# Patient Record
Sex: MALE | Race: WHITE | HISPANIC OR LATINO
[De-identification: names, ages, dates, MRNs, and addresses within clinical notes are randomized per-mention and may not be internally consistent; named-entity substitution may affect disease eponyms.]

---
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
## 2022-01-02 NOTE — REASON FOR VISIT
[New Patient Visit] : a new patient visit [Parent] : parent [FreeTextEntry1] : growing back lesion with pain- referred by dermatology

## 2022-01-02 NOTE — CONSULT LETTER
[Dear  ___] : Dear  [unfilled], [Courtesy Letter:] : I had the pleasure of seeing your patient, [unfilled], in my office today. [Sincerely,] : Sincerely, [FreeTextEntry2] : Dr Tate Del Toro\par [address], [address]\[place] [address] [FreeTextEntry1] :  this very pleasant 20-year-old otherwise healthy male is seen for concerns over an expanding lesion in the posterior cervical thoracic region.  The patient is seen along with his parents.  The patient indicates that he has had this mass at the base of his neck for approximately 10 years.  However, it has been expanding rapidly over the last year.  It now causes discomfort especially with direct pressure such as when sleeping or with close over it.  The patient denies any trauma.  There have not been any other lumps noticed on the patient's skin.  He has been seen by dermatology.  The dermatology evaluation however was concerned by the location and the proximity to the posterior elements of the spine that this may represent something affecting the spinous process or deeper structures.  Appropriately a CT image has now been performed.\par \par I have reviewed with the parents and the patient directly the CT images performed at [facility].  These identify a mass lesion which is very discrete within the subcutaneous tissues.  Fortunately there is no evidence of involvement of the spinal column.  The lesion is just adjacent to the midline at the level of the spinous process of T1 and T2.  There is no evidence of infiltration into the other adjacent musculature.  No other mass lesions are seen.  The alignment of the cervical and upper thoracic spine is normal without any signs of neurocompression.\par \par On examination the patient is in no acute distress.  The patient has a palpable mass at the level of T1 and T2 consistent with the imaging studies.  There is no redness or obvious cutaneous signs other than a protruding lump.  The lump is mobile.  There is only mild discomfort with manipulation.  The patient has normal neck range of motion especially with flexion extension and rotation.  The patient has intact movement and mobility of the scapula.\par \par I have reassured the patient and his parents that this is a likely benign sebaceous cyst or dermoid without any connection to the underlying spine.  There is no evidence of infiltration into the musculature.  It is certainly safe for this lesion to be removed by either a general surgeon or dermatologist.  It is my recommendation that now there is reassurance that the spinal structures are intact that a dermatologist would be appropriate for surgical excision even within the office space.  The patient was very relieved.  At this time I have not arranged for any further follow-up but would be more than pleased to see the patient again should any further concerns or questions arise.\par \par Thank you for very kindly including me in the evaluation and support of your patient.  Please do not hesitate to contact me should you have any concerns or questions regarding this evaluation or the patient's ongoing follow-up care. [FreeTextEntry3] : Willian Michelle MD, PhD, FRCPSC\par  of Neurosurgery\par [facility]\par  of Neurosurgery\par [facility]\par [address] [place] [address], [address]\[place] [address]\par Tel: [phone]\par FAX: [phone]\par

## 2022-01-03 ENCOUNTER — APPOINTMENT (OUTPATIENT)
Dept: DERMATOLOGY | Facility: CLINIC | Age: 22
End: 2022-01-03
Payer: MEDICAID

## 2022-01-03 ENCOUNTER — APPOINTMENT (OUTPATIENT)
Dept: DERMATOLOGY | Facility: CLINIC | Age: 22
End: 2022-01-03

## 2022-01-03 DIAGNOSIS — B07.9 VIRAL WART, UNSPECIFIED: ICD-10-CM

## 2022-01-03 DIAGNOSIS — L72.3 SEBACEOUS CYST: ICD-10-CM

## 2022-01-03 LAB — CORE LAB BIOPSY: NORMAL

## 2022-01-03 PROCEDURE — ZZZZZ: CPT

## 2022-01-03 NOTE — HISTORY OF PRESENT ILLNESS
[de-identified] : Patient presents for suture removal- \par wound healed well, no signs infection\par sutures removed, dressed;\par Path:  Cyst\par slight dehiscence at left side;  dressed;  continue wound care until fills in; \par \par also:  verruca L index finger;  LN2 -  further txs prn\par

## 2022-01-04 ENCOUNTER — APPOINTMENT (OUTPATIENT)
Dept: DERMATOLOGY | Facility: CLINIC | Age: 22
End: 2022-01-04

## 2024-03-01 NOTE — ED PEDIATRIC NURSE NOTE - DISCHARGE DATE/TIME
Contacted medical examiners office, spoke with Mo, released from medical examiner at this time  
[employer] contacted at this time. Spoke with Anika.  
01-Mar-2018 01:07